# Patient Record
Sex: FEMALE | Race: WHITE | NOT HISPANIC OR LATINO | Employment: FULL TIME | URBAN - METROPOLITAN AREA
[De-identification: names, ages, dates, MRNs, and addresses within clinical notes are randomized per-mention and may not be internally consistent; named-entity substitution may affect disease eponyms.]

---

## 2023-05-06 DIAGNOSIS — N94.3 PREMENSTRUAL TENSION SYNDROME: ICD-10-CM

## 2023-05-06 DIAGNOSIS — E03.9 HYPOTHYROIDISM, UNSPECIFIED: ICD-10-CM

## 2023-05-07 RX ORDER — SERTRALINE HYDROCHLORIDE 50 MG/1
TABLET, FILM COATED ORAL
Qty: 90 TABLET | Refills: 3 | Status: SHIPPED | OUTPATIENT
Start: 2023-05-07 | End: 2024-05-02 | Stop reason: SDUPTHER

## 2023-05-07 RX ORDER — LEVOTHYROXINE SODIUM 100 UG/1
TABLET ORAL
Qty: 90 TABLET | Refills: 1 | Status: SHIPPED | OUTPATIENT
Start: 2023-05-07 | End: 2023-11-05

## 2023-10-12 ENCOUNTER — APPOINTMENT (OUTPATIENT)
Dept: PRIMARY CARE | Facility: CLINIC | Age: 33
End: 2023-10-12
Payer: COMMERCIAL

## 2023-10-19 ENCOUNTER — OFFICE VISIT (OUTPATIENT)
Dept: URGENT CARE | Facility: CLINIC | Age: 33
End: 2023-10-19
Payer: COMMERCIAL

## 2023-10-19 VITALS
WEIGHT: 157 LBS | DIASTOLIC BLOOD PRESSURE: 79 MMHG | HEIGHT: 60 IN | SYSTOLIC BLOOD PRESSURE: 116 MMHG | BODY MASS INDEX: 30.82 KG/M2 | HEART RATE: 78 BPM | RESPIRATION RATE: 14 BRPM | OXYGEN SATURATION: 98 % | TEMPERATURE: 97.7 F

## 2023-10-19 DIAGNOSIS — H60.312 ACUTE DIFFUSE OTITIS EXTERNA OF LEFT EAR: Primary | ICD-10-CM

## 2023-10-19 PROCEDURE — 99203 OFFICE O/P NEW LOW 30 MIN: CPT | Performed by: PHYSICIAN ASSISTANT

## 2023-10-19 PROCEDURE — 1036F TOBACCO NON-USER: CPT | Performed by: PHYSICIAN ASSISTANT

## 2023-10-19 RX ORDER — NEOMYCIN SULFATE, POLYMYXIN B SULFATE, HYDROCORTISONE 3.5; 10000; 1 MG/ML; [USP'U]/ML; MG/ML
4 SOLUTION/ DROPS AURICULAR (OTIC) 3 TIMES DAILY
Qty: 10 ML | Refills: 0 | Status: SHIPPED | OUTPATIENT
Start: 2023-10-19 | End: 2023-10-29

## 2023-10-19 ASSESSMENT — PAIN SCALES - GENERAL: PAINLEVEL: 6

## 2023-10-19 NOTE — PROGRESS NOTES
Subjective   Patient ID: Sosa Vargas is a 33 y.o. female who presents for Earache (Pain when touched and hearing loss starting yesterday.).  She denies any fevers or chills.  Denies any trauma to the ear denies any nausea vomiting diarrhea or abdominal pain any chest pain or shortness of breath.  Denies any vertigo.  She is afebrile and nontoxic-appearing at time of exam patient    Past Medical History:   Diagnosis Date    Body mass index (BMI) 29.0-29.9, adult     BMI 29.0-29.9,adult    Other conditions influencing health status 04/15/2020    History of cough    Personal history of other diseases of the circulatory system 11/12/2021    History of supraventricular tachycardia    Personal history of other diseases of the circulatory system 11/19/2021    History of mitral valve disease    Personal history of other diseases of the nervous system and sense organs     History of ear pain         The remainder of the systems were reviewed and are negative unless noted above      Objective   /79   Pulse 78   Temp 36.5 °C (97.7 °F) (Temporal)   Resp 14   Ht 1.524 m (5')   Wt 71.2 kg (157 lb)   LMP 10/08/2023 (Approximate)   SpO2 98%   BMI 30.66 kg/m²   Physical Exam  Constitutional:       General: She is not in acute distress.     Appearance: Normal appearance. She is not ill-appearing, toxic-appearing or diaphoretic.   HENT:      Head: Normocephalic and atraumatic.      Right Ear: Tympanic membrane and ear canal normal.      Left Ear: Tympanic membrane normal.      Ears:      Comments: L EACH edematous, erythematous  Eyes:      Conjunctiva/sclera: Conjunctivae normal.   Cardiovascular:      Rate and Rhythm: Normal rate and regular rhythm.      Heart sounds: No murmur heard.  Pulmonary:      Effort: Pulmonary effort is normal. No respiratory distress.      Breath sounds: Normal breath sounds. No wheezing.   Musculoskeletal:         General: No swelling, tenderness, deformity or signs of injury. Normal range  of motion.      Cervical back: Normal range of motion and neck supple.   Skin:     General: Skin is warm and dry.      Findings: No erythema or rash.   Neurological:      General: No focal deficit present.      Mental Status: She is alert and oriented to person, place, and time.      Gait: Gait normal.         Assessment/Plan   Problem List Items Addressed This Visit       Acute diffuse otitis externa of left ear - Primary    Relevant Medications    neomycin-polymyxin-HC (Cortisporin) otic solution

## 2023-11-05 DIAGNOSIS — E03.9 HYPOTHYROIDISM, UNSPECIFIED: ICD-10-CM

## 2023-11-05 RX ORDER — LEVOTHYROXINE SODIUM 100 UG/1
TABLET ORAL
Qty: 90 TABLET | Refills: 1 | Status: SHIPPED | OUTPATIENT
Start: 2023-11-05 | End: 2024-05-02 | Stop reason: SDUPTHER

## 2023-11-17 ENCOUNTER — OFFICE VISIT (OUTPATIENT)
Dept: PRIMARY CARE | Facility: CLINIC | Age: 33
End: 2023-11-17
Payer: COMMERCIAL

## 2023-11-17 VITALS
BODY MASS INDEX: 31.61 KG/M2 | HEIGHT: 60 IN | WEIGHT: 161 LBS | DIASTOLIC BLOOD PRESSURE: 78 MMHG | OXYGEN SATURATION: 100 % | HEART RATE: 81 BPM | SYSTOLIC BLOOD PRESSURE: 110 MMHG

## 2023-11-17 DIAGNOSIS — Q96.3 MOSAIC TURNER SYNDROME (HHS-HCC): ICD-10-CM

## 2023-11-17 DIAGNOSIS — Z13.220 LIPID SCREENING: ICD-10-CM

## 2023-11-17 DIAGNOSIS — D50.9 IRON DEFICIENCY ANEMIA, UNSPECIFIED IRON DEFICIENCY ANEMIA TYPE: ICD-10-CM

## 2023-11-17 DIAGNOSIS — E03.8 OTHER SPECIFIED HYPOTHYROIDISM: ICD-10-CM

## 2023-11-17 DIAGNOSIS — Z00.00 ANNUAL PHYSICAL EXAM: ICD-10-CM

## 2023-11-17 DIAGNOSIS — R73.9 HYPERGLYCEMIA: ICD-10-CM

## 2023-11-17 DIAGNOSIS — H91.92 HEARING LOSS OF LEFT EAR, UNSPECIFIED HEARING LOSS TYPE: Primary | ICD-10-CM

## 2023-11-17 PROCEDURE — 1036F TOBACCO NON-USER: CPT | Performed by: STUDENT IN AN ORGANIZED HEALTH CARE EDUCATION/TRAINING PROGRAM

## 2023-11-17 PROCEDURE — 99395 PREV VISIT EST AGE 18-39: CPT | Performed by: STUDENT IN AN ORGANIZED HEALTH CARE EDUCATION/TRAINING PROGRAM

## 2023-11-17 ASSESSMENT — PATIENT HEALTH QUESTIONNAIRE - PHQ9
2. FEELING DOWN, DEPRESSED OR HOPELESS: NOT AT ALL
1. LITTLE INTEREST OR PLEASURE IN DOING THINGS: NOT AT ALL
SUM OF ALL RESPONSES TO PHQ9 QUESTIONS 1 AND 2: 0

## 2023-11-17 ASSESSMENT — ENCOUNTER SYMPTOMS
PSYCHIATRIC NEGATIVE: 1
GASTROINTESTINAL NEGATIVE: 1
CARDIOVASCULAR NEGATIVE: 1
NEUROLOGICAL NEGATIVE: 1
MUSCULOSKELETAL NEGATIVE: 1
FATIGUE: 0
RESPIRATORY NEGATIVE: 1

## 2023-11-17 NOTE — PROGRESS NOTES
Subjective   Patient ID: Sosa Vargas is a 33 y.o. female who presents for Annual Exam (Has a preventative form/BW - has not been fasting/Audiologist referral - hearing loss more so in the LT ).  Hearing changes. More noticeable in the past few months, affecting her life. Would like audiology referral.     Feeling more tired lately. More work stress. Hx of iron deficiency anemia, would like her iron checked.     Hx of ear scarring    Had spotting between her periods. Took otc slow flow which stopped the spotting.     Would like labs.     No other concerns today.         Review of Systems   Constitutional:  Negative for fatigue.   HENT: Negative.     Respiratory: Negative.     Cardiovascular: Negative.    Gastrointestinal: Negative.    Genitourinary: Negative.    Musculoskeletal: Negative.    Neurological: Negative.    Psychiatric/Behavioral: Negative.     All other systems reviewed and are negative.      Objective   Physical Exam  Vitals reviewed.   Constitutional:       Appearance: Normal appearance.   HENT:      Head: Normocephalic.      Ears:      Comments: Scarring of bilateral TMs     Mouth/Throat:      Mouth: Mucous membranes are moist.   Eyes:      Pupils: Pupils are equal, round, and reactive to light.   Cardiovascular:      Rate and Rhythm: Normal rate and regular rhythm.   Pulmonary:      Effort: Pulmonary effort is normal. No respiratory distress.      Breath sounds: No wheezing or rhonchi.   Abdominal:      Palpations: Abdomen is soft.      Tenderness: There is no abdominal tenderness.   Musculoskeletal:         General: Normal range of motion.   Skin:     General: Skin is warm and dry.   Neurological:      General: No focal deficit present.      Mental Status: She is alert. Mental status is at baseline.   Psychiatric:         Mood and Affect: Mood normal.         Behavior: Behavior normal.         Body mass index is 31.44 kg/m².      Current Outpatient Medications:     ferrous sulfate (IRON ORAL), Take  by mouth. 36 mg, Disp: , Rfl:     levothyroxine (Synthroid, Levoxyl) 100 mcg tablet, TAKE 1 TABLET BY MOUTH EVERY DAY, Disp: 90 tablet, Rfl: 1    sertraline (Zoloft) 50 mg tablet, TAKE 1 TABLET BY MOUTH EVERY DAY AS DIRECTED, Disp: 90 tablet, Rfl: 3      Assessment/Plan   Diagnoses and all orders for this visit:  Hearing loss of left ear, unspecified hearing loss type  -     Referral to Audiology; Future  Iron deficiency anemia, unspecified iron deficiency anemia type  -     Iron and TIBC; Future  -     CBC and Auto Differential; Future  Other specified hypothyroidism  -     Tsh With Reflex To Free T4 If Abnormal; Future  Lipid screening  -     Lipid panel; Future  Hyperglycemia  -     Hemoglobin A1c; Future  Annual physical exam  -     CBC and Auto Differential; Future  -     Comprehensive metabolic panel; Future  Mosaic Casey syndrome    Follow up annually    Tere Alfred, DO

## 2024-01-02 ENCOUNTER — LAB (OUTPATIENT)
Dept: LAB | Facility: LAB | Age: 34
End: 2024-01-02
Payer: COMMERCIAL

## 2024-01-02 DIAGNOSIS — D50.9 IRON DEFICIENCY ANEMIA, UNSPECIFIED IRON DEFICIENCY ANEMIA TYPE: ICD-10-CM

## 2024-01-02 DIAGNOSIS — R73.9 HYPERGLYCEMIA: ICD-10-CM

## 2024-01-02 DIAGNOSIS — Z00.00 ANNUAL PHYSICAL EXAM: ICD-10-CM

## 2024-01-02 DIAGNOSIS — Z13.220 LIPID SCREENING: ICD-10-CM

## 2024-01-02 DIAGNOSIS — E03.8 OTHER SPECIFIED HYPOTHYROIDISM: ICD-10-CM

## 2024-01-02 LAB
ALBUMIN SERPL BCP-MCNC: 4.2 G/DL (ref 3.4–5)
ALP SERPL-CCNC: 41 U/L (ref 33–110)
ALT SERPL W P-5'-P-CCNC: 12 U/L (ref 7–45)
ANION GAP SERPL CALC-SCNC: 13 MMOL/L (ref 10–20)
AST SERPL W P-5'-P-CCNC: 15 U/L (ref 9–39)
BASOPHILS # BLD AUTO: 0.08 X10*3/UL (ref 0–0.1)
BASOPHILS NFR BLD AUTO: 1.9 %
BILIRUB SERPL-MCNC: 0.3 MG/DL (ref 0–1.2)
BUN SERPL-MCNC: 7 MG/DL (ref 6–23)
CALCIUM SERPL-MCNC: 8.9 MG/DL (ref 8.6–10.3)
CHLORIDE SERPL-SCNC: 104 MMOL/L (ref 98–107)
CHOLEST SERPL-MCNC: 176 MG/DL (ref 0–199)
CHOLESTEROL/HDL RATIO: 3.1
CO2 SERPL-SCNC: 26 MMOL/L (ref 21–32)
CREAT SERPL-MCNC: 0.77 MG/DL (ref 0.5–1.05)
EOSINOPHIL # BLD AUTO: 0.08 X10*3/UL (ref 0–0.7)
EOSINOPHIL NFR BLD AUTO: 1.9 %
ERYTHROCYTE [DISTWIDTH] IN BLOOD BY AUTOMATED COUNT: 19.1 % (ref 11.5–14.5)
EST. AVERAGE GLUCOSE BLD GHB EST-MCNC: 120 MG/DL
GFR SERPL CREATININE-BSD FRML MDRD: >90 ML/MIN/1.73M*2
GLUCOSE SERPL-MCNC: 94 MG/DL (ref 74–99)
HBA1C MFR BLD: 5.8 %
HCT VFR BLD AUTO: 32 % (ref 36–46)
HDLC SERPL-MCNC: 57.7 MG/DL
HGB BLD-MCNC: 9.3 G/DL (ref 12–16)
IMM GRANULOCYTES # BLD AUTO: 0.01 X10*3/UL (ref 0–0.7)
IMM GRANULOCYTES NFR BLD AUTO: 0.2 % (ref 0–0.9)
IRON SATN MFR SERPL: 28 % (ref 25–45)
IRON SERPL-MCNC: 123 UG/DL (ref 35–150)
LDLC SERPL CALC-MCNC: 102 MG/DL
LYMPHOCYTES # BLD AUTO: 1.05 X10*3/UL (ref 1.2–4.8)
LYMPHOCYTES NFR BLD AUTO: 24.9 %
MCH RBC QN AUTO: 22 PG (ref 26–34)
MCHC RBC AUTO-ENTMCNC: 29.1 G/DL (ref 32–36)
MCV RBC AUTO: 76 FL (ref 80–100)
MONOCYTES # BLD AUTO: 0.36 X10*3/UL (ref 0.1–1)
MONOCYTES NFR BLD AUTO: 8.5 %
NEUTROPHILS # BLD AUTO: 2.64 X10*3/UL (ref 1.2–7.7)
NEUTROPHILS NFR BLD AUTO: 62.6 %
NON HDL CHOLESTEROL: 118 MG/DL (ref 0–149)
NRBC BLD-RTO: 0 /100 WBCS (ref 0–0)
PLATELET # BLD AUTO: 325 X10*3/UL (ref 150–450)
POTASSIUM SERPL-SCNC: 4.5 MMOL/L (ref 3.5–5.3)
PROT SERPL-MCNC: 6.7 G/DL (ref 6.4–8.2)
RBC # BLD AUTO: 4.23 X10*6/UL (ref 4–5.2)
SODIUM SERPL-SCNC: 138 MMOL/L (ref 136–145)
TIBC SERPL-MCNC: 437 UG/DL (ref 240–445)
TRIGL SERPL-MCNC: 84 MG/DL (ref 0–149)
TSH SERPL-ACNC: 3.36 MIU/L (ref 0.44–3.98)
UIBC SERPL-MCNC: 314 UG/DL (ref 110–370)
VLDL: 17 MG/DL (ref 0–40)
WBC # BLD AUTO: 4.2 X10*3/UL (ref 4.4–11.3)

## 2024-01-02 PROCEDURE — 80061 LIPID PANEL: CPT

## 2024-01-02 PROCEDURE — 83550 IRON BINDING TEST: CPT

## 2024-01-02 PROCEDURE — 83540 ASSAY OF IRON: CPT

## 2024-01-02 PROCEDURE — 83036 HEMOGLOBIN GLYCOSYLATED A1C: CPT

## 2024-01-02 PROCEDURE — 84443 ASSAY THYROID STIM HORMONE: CPT

## 2024-01-02 PROCEDURE — 36415 COLL VENOUS BLD VENIPUNCTURE: CPT

## 2024-01-02 PROCEDURE — 85025 COMPLETE CBC W/AUTO DIFF WBC: CPT

## 2024-01-02 PROCEDURE — 80053 COMPREHEN METABOLIC PANEL: CPT

## 2024-01-05 ENCOUNTER — CLINICAL SUPPORT (OUTPATIENT)
Dept: AUDIOLOGY | Facility: CLINIC | Age: 34
End: 2024-01-05
Payer: COMMERCIAL

## 2024-01-05 DIAGNOSIS — H90.A32 MIXED CONDUCTIVE AND SENSORINEURAL HEARING LOSS OF LEFT EAR WITH RESTRICTED HEARING OF RIGHT EAR: ICD-10-CM

## 2024-01-05 DIAGNOSIS — H91.91 HIGH FREQUENCY HEARING LOSS, RIGHT: Primary | ICD-10-CM

## 2024-01-05 PROCEDURE — 92557 COMPREHENSIVE HEARING TEST: CPT | Performed by: AUDIOLOGIST

## 2024-01-05 PROCEDURE — 92567 TYMPANOMETRY: CPT | Performed by: AUDIOLOGIST

## 2024-01-05 NOTE — PROGRESS NOTES
"AUDIOLOGY ADULT AUDIOMETRIC EVALUATION      Name:  Sosa Vargas  :  1990  Age:  33 y.o.  Date of Evaluation:  2024    HISTORY  Reason for visit:  hearing loss  Ms. Vargas is seen 2024 at the request of Tere Alfred D.O., for an evaluation of hearing.      Chief complaint:    Hearing issues since childhood  Hearing loss now impacting her daily life (for about 1 year)  History of tympanic membrane perforation    Hearing loss:  left worse than right   Tinnitus:   infrequently; few seconds; not bothersome  Otitis Media: frequent in childhood  Otologic surgical history:  denies  Dizziness/imbalance:  denies  Otalgia:  denies  Ear pressure/fullness:  denies  History of excessive noise exposure:  denies    Hearing aid history: denies          EVALUATION  Please find audiogram in \"Media\" tab (Document Type:  Audiology Report).    RESULTS   Otoscopic Evaluation: clear canals bilaterally      Immittance Measures (226 Hz probe tone):   Right ear: Tympanometry is consistent with low right middle ear pressure and normal right tympanic membrane mobility.    Left ear: Tympanometry is consistent with a left tympanic membrane perforation.       Test technique:  standard behavioral technique via T[facility] earphones; checked with inserts.  Reliability is good.    Pure Tone Audiometry:    Right ear: Hearing sensitivity is within normal limits for the low and middle frequencies, steeply sloping to a profound loss for 8000 Hz.    Left ear: moderately-severe hearing loss for the low frequencies rising to within normal limits at 4000 Hz and sloping to a moderate loss for 8000 Hz.  Left hearing loss is mixed.       Speech Audiometry:        Right Ear:  Speech Reception Threshold (SRT) was obtained at 10 dBHL                 Speech discrimination score was 100% in quiet when words were presented at 60 dBHL      Left Ear:  Speech Reception Threshold (SRT) was obtained at 45 dBHL                 Speech discrimination score was 100% " in quiet when words were presented at 85 dBHL    IMPRESSIONS:  Patient is expected to have communication difficulty in adverse listening environments.    If hearing loss cannot be medically improved, patient would be expected to benefit from devices that provide amplification (e.g., hearing aids) and improve the desired sound signal over that of background noise.      RECOMMENDATIONS  Consultation with an ear, nose, and throat (ENT) specialist regarding asymmetries and left conductive components  Reassess hearing in 1 year (or sooner if medically indicated or if there is a concern for a change in hearing).    Patient may consider a Hearing Aid Evaluation with an audiologist to discuss hearing technology (such as hearing aids) and services, pending medical management and medical clearance.    Continue with medical follow-up as indicated.       PATIENT EDUCATION  Discussed results and recommendations with patient.  Questions were addressed and the patient was encouraged to contact our department should concerns arise.       VENKATA Wiseman, CCC-A  Licensed Audiologist

## 2024-01-30 PROBLEM — R73.9 HYPERGLYCEMIA: Status: ACTIVE | Noted: 2024-01-30

## 2024-01-30 PROBLEM — Z23 IMMUNIZATION DUE: Status: ACTIVE | Noted: 2024-01-30

## 2024-01-30 PROBLEM — F41.9 ANXIETY: Status: ACTIVE | Noted: 2024-01-30

## 2024-01-30 PROBLEM — N93.9 ABNORMAL UTERINE BLEEDING: Status: ACTIVE | Noted: 2024-01-30

## 2024-01-30 PROBLEM — I47.10 PAROXYSMAL SVT (SUPRAVENTRICULAR TACHYCARDIA) (CMS-HCC): Status: ACTIVE | Noted: 2023-03-07

## 2024-01-30 PROBLEM — R05.9 COUGH: Status: ACTIVE | Noted: 2024-01-30

## 2024-01-30 PROBLEM — D64.9 ANEMIA: Status: ACTIVE | Noted: 2022-11-16

## 2024-01-30 PROBLEM — K21.9 GASTROESOPHAGEAL REFLUX DISEASE: Status: ACTIVE | Noted: 2024-01-30

## 2024-01-30 PROBLEM — I35.0 AORTIC STENOSIS, MILD: Status: ACTIVE | Noted: 2024-01-30

## 2024-01-30 PROBLEM — Q24.9 CONGENITAL HEART DISEASE (HHS-HCC): Status: ACTIVE | Noted: 2024-01-30

## 2024-01-30 PROBLEM — N94.3 PMS (PREMENSTRUAL SYNDROME): Status: ACTIVE | Noted: 2024-01-30

## 2024-01-30 PROBLEM — R07.89 CHEST TIGHTNESS: Status: ACTIVE | Noted: 2024-01-30

## 2024-01-30 PROBLEM — R06.00 DYSPNEA: Status: ACTIVE | Noted: 2023-02-10

## 2024-01-30 PROBLEM — Z79.899 ENCOUNTER FOR LONG-TERM (CURRENT) USE OF MEDICATIONS: Status: ACTIVE | Noted: 2024-01-30

## 2024-01-30 PROBLEM — N92.4 EXCESSIVE BLEEDING IN PREMENOPAUSAL PERIOD: Status: ACTIVE | Noted: 2024-01-30

## 2024-01-30 PROBLEM — H92.09 OTALGIA: Status: ACTIVE | Noted: 2023-10-23

## 2024-01-30 PROBLEM — F32.A DEPRESSION: Status: ACTIVE | Noted: 2024-01-30

## 2024-01-30 PROBLEM — R53.83 FATIGUE: Status: ACTIVE | Noted: 2024-01-30

## 2024-01-30 PROBLEM — H91.92 HEARING LOSS OF LEFT EAR: Status: ACTIVE | Noted: 2024-01-30

## 2024-01-30 PROBLEM — D72.819 LEUKOPENIA: Status: ACTIVE | Noted: 2024-01-30

## 2024-01-30 PROBLEM — K90.9 INTESTINAL MALABSORPTION, UNSPECIFIED (HHS-HCC): Status: ACTIVE | Noted: 2022-11-07

## 2024-01-30 PROBLEM — H66.90 OTITIS MEDIA: Status: ACTIVE | Noted: 2023-10-19

## 2024-01-30 PROBLEM — E61.1 IRON DEFICIENCY: Status: ACTIVE | Noted: 2022-11-07

## 2024-01-30 PROBLEM — J02.9 SORE THROAT: Status: ACTIVE | Noted: 2024-01-30

## 2024-01-30 RX ORDER — FLUTICASONE PROPIONATE 50 MCG
SPRAY, SUSPENSION (ML) NASAL
COMMUNITY
Start: 2023-10-23

## 2024-01-30 RX ORDER — OMEPRAZOLE 20 MG/1
CAPSULE, DELAYED RELEASE ORAL
COMMUNITY
Start: 2020-04-15

## 2024-01-30 RX ORDER — IBUPROFEN 600 MG/1
TABLET ORAL
COMMUNITY
Start: 2023-12-04

## 2024-02-05 ENCOUNTER — APPOINTMENT (OUTPATIENT)
Dept: OTOLARYNGOLOGY | Facility: CLINIC | Age: 34
End: 2024-02-05
Payer: COMMERCIAL

## 2024-02-05 ENCOUNTER — OFFICE VISIT (OUTPATIENT)
Dept: HEMATOLOGY/ONCOLOGY | Facility: CLINIC | Age: 34
End: 2024-02-05
Payer: COMMERCIAL

## 2024-02-05 VITALS
RESPIRATION RATE: 16 BRPM | HEART RATE: 94 BPM | DIASTOLIC BLOOD PRESSURE: 74 MMHG | OXYGEN SATURATION: 96 % | WEIGHT: 161.6 LBS | BODY MASS INDEX: 31.56 KG/M2 | TEMPERATURE: 97.3 F | SYSTOLIC BLOOD PRESSURE: 120 MMHG

## 2024-02-05 DIAGNOSIS — E61.1 IRON DEFICIENCY: Primary | ICD-10-CM

## 2024-02-05 PROCEDURE — 99214 OFFICE O/P EST MOD 30 MIN: CPT | Performed by: INTERNAL MEDICINE

## 2024-02-05 PROCEDURE — 1036F TOBACCO NON-USER: CPT | Performed by: INTERNAL MEDICINE

## 2024-02-05 ASSESSMENT — COLUMBIA-SUICIDE SEVERITY RATING SCALE - C-SSRS
2. HAVE YOU ACTUALLY HAD ANY THOUGHTS OF KILLING YOURSELF?: NO
6. HAVE YOU EVER DONE ANYTHING, STARTED TO DO ANYTHING, OR PREPARED TO DO ANYTHING TO END YOUR LIFE?: NO
1. IN THE PAST MONTH, HAVE YOU WISHED YOU WERE DEAD OR WISHED YOU COULD GO TO SLEEP AND NOT WAKE UP?: NO

## 2024-02-05 ASSESSMENT — PAIN SCALES - GENERAL: PAINLEVEL: 0-NO PAIN

## 2024-02-05 ASSESSMENT — PATIENT HEALTH QUESTIONNAIRE - PHQ9
SUM OF ALL RESPONSES TO PHQ9 QUESTIONS 1 AND 2: 0
2. FEELING DOWN, DEPRESSED OR HOPELESS: NOT AT ALL
1. LITTLE INTEREST OR PLEASURE IN DOING THINGS: NOT AT ALL

## 2024-02-05 NOTE — PROGRESS NOTES
YESSICA HATFIELD is a 32 year old Female        Interval History:    The patient was referred to me by Dr. Alfred her PCP for further evaluation and management of chornic anemia. The patient has been seen in the past by  hematologist  Dr. Alston but presents to see me as a new patient on 11/07/2022.      The patient is a  32 year old pleasant female with longstanding history of chronic anemia. She has been tried on oral iron replacement therapy intermittently but without much benefit.  After initial hematological evaluation in Jan 2021 she was noted to  be anemic with profound iron deficiency.  Ferritin was less than 8.  Serum iron was 10 with markedly elevated TIBC.  Extensive other anemia work-up was all normal.  Clinically she has problem with heavy menstrual cycles.  At times, she also has a history  of GERD type symptoms but GI evaluation has been unremarkable.  She was supported with a course of IV iron infusion with Feraheme in February 2021 with significant symptomatic and hematological improvement. The patient was lost to follow up for a year  and now presents back with continued symptomatic anemia. She has been working with her GYN to control heavy menstrual bleeding to assist in controlling her anemia.      At interview on 11/07/2022 the patient narrated  her past medical history which was also adapted from Dr. Siu's clinical charts. The patient has been on oral iron for a total of 6 months now but continues to feel weak and tired.  She denies any history  of fevers, night sweats, unexplained weight loss, shortness of breath, chest pain, constipation, confusion, nausea, vomiting, diarrhea, hemoptysis, hematemesis, hematuria and hematochezia. No history of localized or systemic bleeding and infection.      The patient had come for a follow up on February 5, 2024 regarding history of iron deficiency anemia secondary to heavy menstrual periods.  The patient was prescribed oral iron replacement therapy  "but discontinued more than a month ago.  Continues to feel weak and tired.        Past Medical History  1. Anemia as detailed above.   2.  Hypothyroidism   3.  GERD   4.  Anxiety/depression.  5.  History of congenital heart disease s/p open heart surgery for \"leaky mitral valve\"     Family Medical History:  1. Father age 70 and mother age 62 in good health.    2. 1 sister without any medical problems.    3. She does not have any children.      Review of Systems:   ·  System Review All Systems:  Negative            Allergies and Intolerances:       Allergies:         No Known Allergies: Active     Outpatient Medication Profile:  * Patient Currently Takes Medications as of 16-Nov-2022 11:30 documented in Structured Notes         levothyroxine 100 mcg (0.1 mg) oral tablet : Last Dose Taken:  , 1 tab(s) orally once a day         sertraline 50 mg oral tablet: Last Dose Taken:  , 1 tab(s) orally once  a day         Melatonin 10 mg oral capsule: Last Dose Taken:  , 1 cap(s) orally once  a day (at bedtime)         ferrous sulfate (as elemental iron) 45 mg oral tablet, extended release : Last Dose Taken:  , 1 tab(s) orally once a day     Social History:   Social Substance History:  ·  Smoking Status never smoker (1)   ·  Alcohol Use occasionally   ·  Additional History     SOCIAL HISTORY:   Single and lives with her parents in [place].  Non-smoker.  Rare social alcohol intake.  She works as a pediatric mental health therapist.  Born in [facility] but moved to [place] age of 8.     OB/GYN HISTORY:  Premenopausal.  Nulliparous.  Menstrual cycles are heavy and irregular.(1)           Performance:   ECOG Performance Status: 0 Fully Active         Vitals and Measurements:   Vitals: Temp: 36.5  HR: 79  RR: 16  BP: 112/72  SPO2%:   100   Measurements: HT(cm): 150.1  WT(kg): 76.6  BSA: 1.78   BMI:  33.9      Physical Exam:      Constitutional: Well developed, awake/alert/oriented  x3, no distress, alert and cooperative   Eyes: PERRL, " EOMI, clear sclera   ENMT: mucous membranes moist, no apparent injury,  no lesions seen   Head/Neck: Neck supple, no apparent injury, thyroid  without mass or tenderness, No JVD, trachea midline, no bruits   Respiratory/Thorax: Patent airways, CTAB, normal  breath sounds with good chest expansion, thorax symmetric   Cardiovascular: Regular, rate and rhythm, no murmurs,  2+ equal pulses of the extremities, normal S 1and S 2   Gastrointestinal: Nondistended, soft, non-tender,  no rebound tenderness or guarding, no masses palpable, no organomegaly, +BS, no bruits   Genitourinary: No Discharge, vesicles or other abnormalities   Musculoskeletal: ROM intact, no joint swelling, normal  strength   Extremities: normal extremities, no cyanosis edema,  contusions or wounds, no clubbing   Neurological: alert and oriented x3, intact senses,  motor, response and reflexes, normal strength   Breast: No masses, tenderness, no discharge or discoloration   Lymphatic: No significant lymphadenopathy   Psychological: Appropriate mood and behavior   Skin: Warm and dry, no lesions, no rashes         Lab Results:              Assessment and Plan:         1. Anemia      The patient is a  32 year old pleasant female with longstanding history of chronic anemia. She has been tried on oral iron replacement therapy intermittently but without much benefit.  After initial hematological evaluation in Jan 2021 she was noted to  be anemic with profound iron deficiency.  Ferritin was less than 8.  Serum iron was 10 with markedly elevated TIBC.  Extensive other anemia work-up was all normal.  Clinically she has problem with heavy menstrual cycles.  At times, she also has a history  of GERD type symptoms but GI evaluation has been unremarkable.  She was supported with a course of IV iron infusion with Feraheme in February 2021 with significant symptomatic and hematological improvement. The patient was lost to follow up for a year  and now presents back  with continued symptomatic anemia.      Physical exam was within normal limits. CBC revealed indications of anemia with low HGB and low HCT.  I reviewed the lab data with the patient. I had a detailed discussion with the patient and explained to the patient the significance of the roles of  RBC's , hemoglobin and iron in transporting oxygen throughout the body. Differential diagnosis is wide.  I have recommended initial evaluation of anemia and reassess.  The patient is agreeable and will return in 3 weeks.       The patient had come for a follow up on 11/16/2022. Physical exam was within normal limits. I reviewed the lab data with the patient. CBC obtained on 11/16/2022 revealed WBC 4.0, HGB 10.4, HCT 35.1, , Neut 2.26.  Iron indices revealed serum iron 104, TIBC  elevated at 457, iron saturation low at 23% and ferritin normal at 12. B12, folates and TSH are WNL. Vitamin D abnormal at 19. I recommended that the patient initiate oral iron replacement therapy using OTC  Ferrex 150 mg 30 minutes after a meal daily. I discussed with the patient that iron is one substance in the body that has no means of excretion except for bleeding. I also notified the patient that it generally takes 4-6 weeks for hemoglobin  to rise.  I also recommended Vitamin D2 , 50,000Units by mouth every week X12 then OTC vitamin D3 1000 units PO QD.      The patient had come for a follow up on February 5, 2024 regarding history of iron deficiency anemia secondary to heavy menstrual periods.  The patient was prescribed oral iron replacement therapy but discontinued more than a month ago.  Continues to feel weak and tired.  We reviewed several years worth of data and explained to the patient that her hemoglobin tends to run low, low MCV and iron deficiency component.  The patient now understands and is agreeable to remain on iron until further evaluation.  Also in the past the patient's hemoglobin normalized after iron replacement therapy.  " I have recommended Ferrex 150 mg p.o. half an hour after food daily.  Reassess in 3 months.    2.  Hypothyroidism  - Levothyroxine 100 mcg PO QD      3.  GERD     4.  Anxiety/depression  - Sertraline 50 mg PO QD      5.  History of congenital heart disease s/p open heart surgery for \"leaky mitral valve\"  "

## 2024-05-02 DIAGNOSIS — N94.3 PREMENSTRUAL TENSION SYNDROME: ICD-10-CM

## 2024-05-02 DIAGNOSIS — E03.9 HYPOTHYROIDISM, UNSPECIFIED: ICD-10-CM

## 2024-05-02 RX ORDER — SERTRALINE HYDROCHLORIDE 50 MG/1
TABLET, FILM COATED ORAL
Qty: 90 TABLET | Refills: 3 | Status: SHIPPED | OUTPATIENT
Start: 2024-05-02 | End: 2024-05-22 | Stop reason: WASHOUT

## 2024-05-02 RX ORDER — LEVOTHYROXINE SODIUM 100 UG/1
TABLET ORAL
Qty: 90 TABLET | Refills: 1 | Status: SHIPPED | OUTPATIENT
Start: 2024-05-02

## 2024-05-06 ENCOUNTER — APPOINTMENT (OUTPATIENT)
Dept: HEMATOLOGY/ONCOLOGY | Facility: CLINIC | Age: 34
End: 2024-05-06
Payer: COMMERCIAL

## 2024-05-22 DIAGNOSIS — F32.A DEPRESSION, UNSPECIFIED DEPRESSION TYPE: Primary | ICD-10-CM

## 2024-05-22 RX ORDER — SERTRALINE HYDROCHLORIDE 100 MG/1
100 TABLET, FILM COATED ORAL DAILY
Qty: 30 TABLET | Refills: 11 | Status: SHIPPED | OUTPATIENT
Start: 2024-05-22 | End: 2025-05-22

## 2024-10-28 DIAGNOSIS — E03.9 HYPOTHYROIDISM, UNSPECIFIED: ICD-10-CM

## 2024-10-28 RX ORDER — LEVOTHYROXINE SODIUM 100 UG/1
TABLET ORAL
Qty: 90 TABLET | Refills: 0 | Status: SHIPPED | OUTPATIENT
Start: 2024-10-28